# Patient Record
Sex: FEMALE | URBAN - METROPOLITAN AREA
[De-identification: names, ages, dates, MRNs, and addresses within clinical notes are randomized per-mention and may not be internally consistent; named-entity substitution may affect disease eponyms.]

---

## 2024-01-10 ENCOUNTER — APPOINTMENT (RX ONLY)
Dept: URBAN - METROPOLITAN AREA TELEMEDICINE 2 | Facility: TELEMEDICINE | Age: 69
Setting detail: DERMATOLOGY
End: 2024-01-10

## 2024-01-10 DIAGNOSIS — Z41.9 ENCOUNTER FOR PROCEDURE FOR PURPOSES OTHER THAN REMEDYING HEALTH STATE, UNSPECIFIED: ICD-10-CM

## 2024-01-10 PROCEDURE — ? OTHER

## 2024-01-10 PROCEDURE — ? COSMETIC CONSULTATION: FILLERS

## 2024-01-10 PROCEDURE — ? COSMETIC CONSULTATION: BOTULINUM TOXIN

## 2024-01-10 PROCEDURE — ? BOTOX

## 2024-01-10 PROCEDURE — ? PATIENT SPECIFIC COUNSELING

## 2024-01-10 PROCEDURE — ? RECOMMENDATIONS

## 2024-01-10 NOTE — PROCEDURE: BOTOX
Levator Labii Superioris Units: 0
Show Right And Left Brow Units: No
Show Depressor Anguli Units: Yes
Expiration Date (Month Year): 12/31/25
Periorbital Skin Units: 20
Price (Use Numbers Only, No Special Characters Or $): 357
Additional Area 2 Location: Lower lids
Incrementing Botox Units: By 0.5 Units
Consent: Written consent obtained. Risks include but not limited to lid/brow ptosis, bruising, swelling, diplopia, temporary effect, incomplete chemical denervation.
Post-Care Instructions: Patient instructed to not lie down for 4 hours and limit physical activity for 24 hours. Patient instructed not to travel by airplane for 48 hours.
Lot #: [id number]
Additional Area 1 Location: Brows
Dilution (U/0.1 Cc): 5
Detail Level: Detailed
Reconstitution Date (Optional): 1/8/24

## 2024-01-10 NOTE — PROCEDURE: RECOMMENDATIONS
Recommendation Preamble: The following recommendations were made during the visit:
Recommendations (Free Text): 3-4 syringes of Lyft for cheeks, NLF and Marionettes
Detail Level: Simple
Render Risk Assessment In Note?: no